# Patient Record
Sex: FEMALE | Race: WHITE | NOT HISPANIC OR LATINO | Employment: PART TIME | URBAN - METROPOLITAN AREA
[De-identification: names, ages, dates, MRNs, and addresses within clinical notes are randomized per-mention and may not be internally consistent; named-entity substitution may affect disease eponyms.]

---
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
## 2022-03-06 ENCOUNTER — OFFICE VISIT (OUTPATIENT)
Dept: URGENT CARE | Facility: CLINIC | Age: 22
End: 2022-03-06
Payer: COMMERCIAL

## 2022-03-06 VITALS
OXYGEN SATURATION: 97 % | HEIGHT: 61 IN | WEIGHT: 116.8 LBS | DIASTOLIC BLOOD PRESSURE: 70 MMHG | SYSTOLIC BLOOD PRESSURE: 102 MMHG | TEMPERATURE: 97.8 F | BODY MASS INDEX: 22.05 KG/M2 | RESPIRATION RATE: 14 BRPM | HEART RATE: 88 BPM

## 2022-03-06 DIAGNOSIS — H10.31 ACUTE BACTERIAL CONJUNCTIVITIS OF RIGHT EYE: ICD-10-CM

## 2022-03-06 PROCEDURE — 99213 OFFICE O/P EST LOW 20 MIN: CPT | Performed by: NURSE PRACTITIONER

## 2022-03-06 RX ORDER — ESCITALOPRAM OXALATE 10 MG/1
10 TABLET ORAL DAILY
COMMUNITY

## 2022-03-06 RX ORDER — GENTAMICIN SULFATE 3 MG/ML
1 SOLUTION/ DROPS OPHTHALMIC EVERY 4 HOURS
Qty: 5 ML | Refills: 0 | Status: SHIPPED | OUTPATIENT
Start: 2022-03-06 | End: 2022-03-11

## 2022-03-06 ASSESSMENT — ENCOUNTER SYMPTOMS
EYE REDNESS: 1
EYE PAIN: 1
SHORTNESS OF BREATH: 0
EYE ITCHING: 1
VOMITING: 0
EYE DISCHARGE: 1
DOUBLE VISION: 0
PHOTOPHOBIA: 0
FEVER: 0
SORE THROAT: 0
MYALGIAS: 0
NAUSEA: 0
CHILLS: 0
DIZZINESS: 0

## 2022-03-06 NOTE — PROGRESS NOTES
"Subjective:   Dian Taylor is a 22 y.o. female who presents for Eye Problem ((R) eye, red/sore x 4 days )      Eye Problem   The right eye is affected. This is a new problem. Episode onset: 4 days;  The problem occurs constantly. The problem has been unchanged. The injury mechanism was contact lenses. The pain is at a severity of 2/10. The pain is mild. There is no known exposure to pink eye. She wears contacts. Associated symptoms include an eye discharge, eye redness and itching. Pertinent negatives include no double vision, fever, nausea, photophobia, recent URI or vomiting. She has tried nothing for the symptoms. The treatment provided no relief.       Review of Systems   Constitutional: Negative for chills and fever.   HENT: Negative for sore throat.    Eyes: Positive for pain, discharge, redness and itching. Negative for double vision and photophobia.   Respiratory: Negative for shortness of breath.    Cardiovascular: Negative for chest pain.   Gastrointestinal: Negative for nausea and vomiting.   Genitourinary: Negative for hematuria.   Musculoskeletal: Negative for myalgias.   Skin: Negative for rash.   Neurological: Negative for dizziness.       Medications:    • escitalopram Tabs  • gentamicin Soln    Allergies: Patient has no known allergies.    Problem List: Dian Taylor does not have a problem list on file.    Surgical History:  No past surgical history on file.    Past Social Hx: Dian Taylor  reports that she has never smoked. She has never used smokeless tobacco. She reports previous alcohol use. She reports that she does not use drugs.     Past Family Hx:  Dian Taylor family history is not on file.     Problem list, medications, and allergies reviewed by myself today in Epic.     Objective:     /70   Pulse 88   Temp 36.6 °C (97.8 °F)   Resp 14   Ht 1.549 m (5' 1\")   Wt 53 kg (116 lb 12.8 oz)   SpO2 97%   BMI 22.07 kg/m²     Physical " Exam  Constitutional:       Appearance: Normal appearance. She is not ill-appearing or toxic-appearing.   HENT:      Head: Normocephalic. No right periorbital erythema or left periorbital erythema.      Right Ear: External ear normal.      Left Ear: External ear normal.      Nose: Nose normal.      Mouth/Throat:      Lips: Pink.      Mouth: Mucous membranes are moist.   Eyes:      General: Lids are normal.         Right eye: Discharge present.         Left eye: No discharge.      Conjunctiva/sclera:      Right eye: Right conjunctiva is injected.   Pulmonary:      Effort: Pulmonary effort is normal. No accessory muscle usage or respiratory distress.   Musculoskeletal:         General: Normal range of motion.      Cervical back: Full passive range of motion without pain.   Skin:     Coloration: Skin is not pale.   Neurological:      Mental Status: She is alert and oriented to person, place, and time.   Psychiatric:         Mood and Affect: Mood normal.         Thought Content: Thought content normal.        Visual Acuity Screening    Right eye Left eye Both eyes   Without correction: 20/50 20/30 20/25   With correction:          Assessment/Plan:     Diagnosis and associated orders:     1. Acute bacterial conjunctivitis of right eye  gentamicin (GARAMYCIN) 0.3 % Solution      Comments/MDM:   Recommended not wearing contacts until infection has resolved  Warm compresses to affected eye(s) 3 times daily  Hand hygiene discussed  Wash all recently used linens and towels in hot water  • Do not touch dropper to eye (s)    Differential diagnosis, natural history, supportive care, and indications for immediate follow-up discussed.            Please note that this dictation was created using voice recognition software. I have made a reasonable attempt to correct obvious errors, but I expect that there are errors of grammar and possibly content that I did not discover before finalizing the note.    This note was electronically  signed by iLor OTERO.

## 2023-12-03 ENCOUNTER — OFFICE VISIT (OUTPATIENT)
Dept: URGENT CARE | Facility: CLINIC | Age: 23
End: 2023-12-03
Payer: COMMERCIAL

## 2023-12-03 VITALS
TEMPERATURE: 97.3 F | OXYGEN SATURATION: 99 % | SYSTOLIC BLOOD PRESSURE: 106 MMHG | RESPIRATION RATE: 17 BRPM | HEART RATE: 96 BPM | HEIGHT: 61 IN | DIASTOLIC BLOOD PRESSURE: 62 MMHG | BODY MASS INDEX: 23.22 KG/M2 | WEIGHT: 123 LBS

## 2023-12-03 DIAGNOSIS — B96.89 ACUTE BACTERIAL SINUSITIS: ICD-10-CM

## 2023-12-03 DIAGNOSIS — J01.90 ACUTE BACTERIAL SINUSITIS: ICD-10-CM

## 2023-12-03 PROCEDURE — 3074F SYST BP LT 130 MM HG: CPT

## 2023-12-03 PROCEDURE — 3078F DIAST BP <80 MM HG: CPT

## 2023-12-03 PROCEDURE — 99213 OFFICE O/P EST LOW 20 MIN: CPT

## 2023-12-03 RX ORDER — FLUTICASONE PROPIONATE 50 MCG
1 SPRAY, SUSPENSION (ML) NASAL DAILY
Qty: 16 G | Refills: 0 | Status: SHIPPED | OUTPATIENT
Start: 2023-12-03

## 2023-12-03 RX ORDER — AMOXICILLIN AND CLAVULANATE POTASSIUM 875; 125 MG/1; MG/1
1 TABLET, FILM COATED ORAL 2 TIMES DAILY
Qty: 14 TABLET | Refills: 0 | Status: SHIPPED | OUTPATIENT
Start: 2023-12-03 | End: 2023-12-10

## 2023-12-03 ASSESSMENT — ENCOUNTER SYMPTOMS
SINUS PAIN: 1
COUGH: 1
FEVER: 1

## 2023-12-03 NOTE — PROGRESS NOTES
"Subjective:   Dian Taylor is a 23 y.o. female who presents for Fever (Patient states that she has had symptoms for a week and a half. ), Body Aches, and Nasal Congestion      HPI: This is a 23-year-old female who presents today for sinus problem.  Patient reports developing nasal congestion, cough,, fevers, body aches, discolored nasal drainage 1.5 weeks ago.  She reports taking Mucinex and NyQuil without any improvement in her symptoms.  She reports associated mild wheezing and shortness of breath.  She denies underlying history of asthma.    Review of Systems   Constitutional:  Positive for fever.   HENT:  Positive for congestion and sinus pain.    Respiratory:  Positive for cough.        Medications:    Current Outpatient Medications on File Prior to Visit   Medication Sig Dispense Refill    escitalopram (LEXAPRO) 10 MG Tab Take 10 mg by mouth every day.       No current facility-administered medications on file prior to visit.        Allergies:   Patient has no known allergies.    Problem List:   There is no problem list on file for this patient.       Surgical History:  No past surgical history on file.    Past Social Hx:   Social History     Tobacco Use    Smoking status: Never    Smokeless tobacco: Never   Vaping Use    Vaping Use: Never used   Substance Use Topics    Alcohol use: Not Currently    Drug use: Never          Problem list, medications, and allergies reviewed by myself today in Epic.     Objective:     /62 (BP Location: Left arm, Patient Position: Sitting, BP Cuff Size: Adult)   Pulse 96   Temp 36.3 °C (97.3 °F) (Temporal)   Resp 17   Ht 1.549 m (5' 1\")   Wt 55.8 kg (123 lb)   SpO2 99%   BMI 23.24 kg/m²     Physical Exam  Vitals and nursing note reviewed.   Constitutional:       General: She is not in acute distress.     Appearance: Normal appearance. She is normal weight. She is not ill-appearing, toxic-appearing or diaphoretic.   HENT:      Head: Normocephalic and " atraumatic.      Right Ear: Tympanic membrane, ear canal and external ear normal. There is no impacted cerumen.      Left Ear: Tympanic membrane, ear canal and external ear normal. There is no impacted cerumen.      Nose: Congestion and rhinorrhea present.      Right Turbinates: Swollen.      Left Turbinates: Swollen.      Mouth/Throat:      Mouth: Mucous membranes are moist.      Pharynx: Oropharynx is clear. No oropharyngeal exudate or posterior oropharyngeal erythema.   Cardiovascular:      Rate and Rhythm: Normal rate and regular rhythm.      Pulses: Normal pulses.      Heart sounds: Normal heart sounds. No murmur heard.     No friction rub. No gallop.   Pulmonary:      Effort: Pulmonary effort is normal. No respiratory distress.      Breath sounds: Normal breath sounds. No stridor. No wheezing, rhonchi or rales.   Chest:      Chest wall: No tenderness.   Musculoskeletal:      Cervical back: Neck supple. No tenderness.   Lymphadenopathy:      Cervical: No cervical adenopathy.   Skin:     General: Skin is warm and dry.      Capillary Refill: Capillary refill takes less than 2 seconds.   Neurological:      General: No focal deficit present.      Mental Status: She is alert and oriented to person, place, and time. Mental status is at baseline.      Cranial Nerves: No cranial nerve deficit.      Motor: No weakness.      Gait: Gait normal.   Psychiatric:         Mood and Affect: Mood normal.         Behavior: Behavior normal.         Thought Content: Thought content normal.         Judgment: Judgment normal.         Assessment/Plan:     Diagnosis and associated orders:   1. Acute bacterial sinusitis  amoxicillin-clavulanate (AUGMENTIN) 875-125 MG Tab    fluticasone (FLONASE) 50 MCG/ACT nasal spray             Comments/MDM:   Pt is clinically stable at today's acute urgent care visit.  No acute distress noted. Appropriate for outpatient management at this time.     Acute problem  Augmentin as prescribed  Fluticasone  nasal spray as prescribed  Intranasal saline irrigations  Alternate Tylenol and ibuprofen as needed for pain  Using [place] fire  Return for any new or worsening signs or symptoms or symptoms that fail to improve.           Discussed DDx, management options (risks,benefits, and alternatives to planned treatment), natural progression and supportive care.  Expressed understanding and the treatment plan was agreed upon. Questions were encouraged and answered   Return to urgent care prn if new or worsening sx or if there is no improvement in condition prn.    Educated in Red flags and indications to immediately call 911 or present to the Emergency Department.   Advised the patient to follow-up with the primary care physician for recheck, reevaluation, and consideration of further management.    I personally reviewed prior external notes and test results pertinent to today's visit.  I have independently reviewed and interpreted all diagnostics ordered during this urgent care acute visit.       Please note that this dictation was created using voice recognition software. I have made a reasonable attempt to correct obvious errors, but I expect that there are errors of grammar and possibly content that I did not discover before finalizing the note.    This note was electronically signed by BRANDEN Sigala

## 2024-02-04 DIAGNOSIS — J01.90 ACUTE BACTERIAL SINUSITIS: ICD-10-CM

## 2024-02-04 DIAGNOSIS — B96.89 ACUTE BACTERIAL SINUSITIS: ICD-10-CM

## 2024-02-20 ENCOUNTER — OFFICE VISIT (OUTPATIENT)
Dept: URGENT CARE | Facility: CLINIC | Age: 24
End: 2024-02-20
Payer: COMMERCIAL

## 2024-02-20 ENCOUNTER — HOSPITAL ENCOUNTER (OUTPATIENT)
Facility: MEDICAL CENTER | Age: 24
End: 2024-02-20
Attending: PHYSICIAN ASSISTANT
Payer: COMMERCIAL

## 2024-02-20 VITALS
WEIGHT: 125.3 LBS | HEART RATE: 82 BPM | TEMPERATURE: 98.4 F | SYSTOLIC BLOOD PRESSURE: 102 MMHG | RESPIRATION RATE: 16 BRPM | BODY MASS INDEX: 23.66 KG/M2 | OXYGEN SATURATION: 98 % | DIASTOLIC BLOOD PRESSURE: 70 MMHG | HEIGHT: 61 IN

## 2024-02-20 DIAGNOSIS — N89.8 VAGINAL IRRITATION: ICD-10-CM

## 2024-02-20 DIAGNOSIS — R30.9 URINATION PAIN: ICD-10-CM

## 2024-02-20 LAB
APPEARANCE UR: CLEAR
BILIRUB UR STRIP-MCNC: NEGATIVE MG/DL
COLOR UR AUTO: YELLOW
GLUCOSE UR STRIP.AUTO-MCNC: NEGATIVE MG/DL
KETONES UR STRIP.AUTO-MCNC: NEGATIVE MG/DL
LEUKOCYTE ESTERASE UR QL STRIP.AUTO: NORMAL
NITRITE UR QL STRIP.AUTO: NEGATIVE
PH UR STRIP.AUTO: 8 [PH] (ref 5–8)
POCT INT CON NEG: NEGATIVE
POCT INT CON POS: POSITIVE
POCT URINE PREGNANCY TEST: NEGATIVE
PROT UR QL STRIP: NORMAL MG/DL
RBC UR QL AUTO: NEGATIVE
SP GR UR STRIP.AUTO: 1.01
UROBILINOGEN UR STRIP-MCNC: NORMAL MG/DL

## 2024-02-20 PROCEDURE — 99214 OFFICE O/P EST MOD 30 MIN: CPT | Performed by: PHYSICIAN ASSISTANT

## 2024-02-20 PROCEDURE — 87480 CANDIDA DNA DIR PROBE: CPT

## 2024-02-20 PROCEDURE — 81002 URINALYSIS NONAUTO W/O SCOPE: CPT | Performed by: PHYSICIAN ASSISTANT

## 2024-02-20 PROCEDURE — 87660 TRICHOMONAS VAGIN DIR PROBE: CPT

## 2024-02-20 PROCEDURE — 3074F SYST BP LT 130 MM HG: CPT | Performed by: PHYSICIAN ASSISTANT

## 2024-02-20 PROCEDURE — 87491 CHLMYD TRACH DNA AMP PROBE: CPT

## 2024-02-20 PROCEDURE — 87591 N.GONORRHOEAE DNA AMP PROB: CPT

## 2024-02-20 PROCEDURE — 87510 GARDNER VAG DNA DIR PROBE: CPT

## 2024-02-20 PROCEDURE — 3078F DIAST BP <80 MM HG: CPT | Performed by: PHYSICIAN ASSISTANT

## 2024-02-20 PROCEDURE — 87086 URINE CULTURE/COLONY COUNT: CPT

## 2024-02-20 PROCEDURE — 81025 URINE PREGNANCY TEST: CPT | Performed by: PHYSICIAN ASSISTANT

## 2024-02-20 ASSESSMENT — ENCOUNTER SYMPTOMS
SORE THROAT: 0
ABDOMINAL PAIN: 0
MYALGIAS: 0
FEVER: 0
CHILLS: 0
VOMITING: 0
SHORTNESS OF BREATH: 0
COUGH: 0
EYE PAIN: 0
DIARRHEA: 0
NAUSEA: 0
CONSTIPATION: 0
HEADACHES: 0

## 2024-02-20 NOTE — PROGRESS NOTES
"Subjective:   Dian Taylor is a 24 y.o. female who presents for UTI (UTI started two weeks ago and she thought it was gone until this morning. Really bad burning when urinating.)      24 years old female presents for.  Vaginal irritation, started around 2 weeks ago but since this morning it has been an ongoing burning.  She has noted some urinary frequency but notes vaginal burning even when not urinating.  She has not noted any external rash or lesions.  She has history of a previous yeast infection as well as UTI and notes the symptoms feel different.  She is sexually active    Review of Systems   Constitutional:  Negative for chills and fever.   HENT:  Negative for congestion, ear pain and sore throat.    Eyes:  Negative for pain.   Respiratory:  Negative for cough and shortness of breath.    Cardiovascular:  Negative for chest pain.   Gastrointestinal:  Negative for abdominal pain, constipation, diarrhea, nausea and vomiting.   Genitourinary:  Positive for dysuria.   Musculoskeletal:  Negative for myalgias.   Skin:  Negative for rash.   Neurological:  Negative for headaches.       Medications, Allergies, and current problem list reviewed today in Epic.     Objective:     /70 (BP Location: Left arm, Patient Position: Sitting, BP Cuff Size: Adult)   Pulse 82   Temp 36.9 °C (98.4 °F) (Temporal)   Resp 16   Ht 1.549 m (5' 1\")   Wt 56.8 kg (125 lb 4.8 oz)   SpO2 98%     Physical Exam  Exam conducted with a chaperone present.   Abdominal:      Hernia: There is no hernia in the left inguinal area.   Genitourinary:     General: Normal vulva.      Labia:         Right: No rash, tenderness, lesion or injury.         Left: No rash, tenderness, lesion or injury.       Vagina: Normal. No signs of injury. No vaginal discharge, erythema, tenderness or lesions.      Comments: ARIEL ARMENDARIZ as chaperone. Limited external exam. No abnormalities.   Lymphadenopathy:      Lower Body: No left inguinal adenopathy. "         Assessment/Plan:     Diagnosis and associated orders:     1. Urination pain  POCT Pregnancy    POCT Urinalysis    URINE CULTURE(NEW)      2. Vaginal irritation  VAGINAL PATHOGENS DNA PANEL    Chlamydia/GC, PCR (Genital/Anal swab)         Comments/MDM:     Urinalysis unremarkable.  Discussed empiric treatment but it seems unclear the etiology of her symptoms.  Parents did test was negative.  Will send out urine culture as well as vaginal pathogens and gonorrhea and chlamydia swab.  On exam no abnormalities..         Differential diagnosis, natural history, supportive care, and indications for immediate follow-up discussed.    Advised the patient to follow-up with the primary care physician for recheck, reevaluation, and consideration of further management.    Please note that this dictation was created using voice recognition software. I have made a reasonable attempt to correct obvious errors, but I expect that there are errors of grammar and possibly content that I did not discover before finalizing the note.    This note was electronically signed by Marciano Mcgarry PA-C

## 2024-02-21 LAB
C TRACH DNA GENITAL QL NAA+PROBE: NEGATIVE
CANDIDA DNA VAG QL PROBE+SIG AMP: NEGATIVE
G VAGINALIS DNA VAG QL PROBE+SIG AMP: NEGATIVE
N GONORRHOEA DNA GENITAL QL NAA+PROBE: NEGATIVE
SPECIMEN SOURCE: NORMAL
T VAGINALIS DNA VAG QL PROBE+SIG AMP: NEGATIVE

## 2024-02-22 LAB
BACTERIA UR CULT: NORMAL
SIGNIFICANT IND 70042: NORMAL
SITE SITE: NORMAL
SOURCE SOURCE: NORMAL

## 2024-03-11 ENCOUNTER — APPOINTMENT (OUTPATIENT)
Dept: RADIOLOGY | Facility: MEDICAL CENTER | Age: 24
End: 2024-03-11
Attending: EMERGENCY MEDICINE
Payer: COMMERCIAL

## 2024-03-11 ENCOUNTER — HOSPITAL ENCOUNTER (EMERGENCY)
Facility: MEDICAL CENTER | Age: 24
End: 2024-03-11
Attending: EMERGENCY MEDICINE
Payer: COMMERCIAL

## 2024-03-11 VITALS
HEIGHT: 61 IN | OXYGEN SATURATION: 97 % | HEART RATE: 69 BPM | DIASTOLIC BLOOD PRESSURE: 63 MMHG | WEIGHT: 126.32 LBS | RESPIRATION RATE: 16 BRPM | BODY MASS INDEX: 23.85 KG/M2 | TEMPERATURE: 97.5 F | SYSTOLIC BLOOD PRESSURE: 101 MMHG

## 2024-03-11 DIAGNOSIS — R30.0 DYSURIA: ICD-10-CM

## 2024-03-11 LAB
ALBUMIN SERPL BCP-MCNC: 4.2 G/DL (ref 3.2–4.9)
ALBUMIN/GLOB SERPL: 1.4 G/DL
ALP SERPL-CCNC: 60 U/L (ref 30–99)
ALT SERPL-CCNC: 11 U/L (ref 2–50)
ANION GAP SERPL CALC-SCNC: 11 MMOL/L (ref 7–16)
APPEARANCE UR: CLEAR
AST SERPL-CCNC: 24 U/L (ref 12–45)
BACTERIA #/AREA URNS HPF: ABNORMAL /HPF
BASOPHILS # BLD AUTO: 0.9 % (ref 0–1.8)
BASOPHILS # BLD: 0.05 K/UL (ref 0–0.12)
BILIRUB SERPL-MCNC: 0.3 MG/DL (ref 0.1–1.5)
BILIRUB UR QL STRIP.AUTO: ABNORMAL
BUN SERPL-MCNC: 10 MG/DL (ref 8–22)
CALCIUM ALBUM COR SERPL-MCNC: 8.8 MG/DL (ref 8.5–10.5)
CALCIUM SERPL-MCNC: 9 MG/DL (ref 8.5–10.5)
CHLORIDE SERPL-SCNC: 101 MMOL/L (ref 96–112)
CO2 SERPL-SCNC: 22 MMOL/L (ref 20–33)
COLOR UR: ABNORMAL
CREAT SERPL-MCNC: 0.64 MG/DL (ref 0.5–1.4)
EOSINOPHIL # BLD AUTO: 0.04 K/UL (ref 0–0.51)
EOSINOPHIL NFR BLD: 0.7 % (ref 0–6.9)
EPI CELLS #/AREA URNS HPF: ABNORMAL /HPF
ERYTHROCYTE [DISTWIDTH] IN BLOOD BY AUTOMATED COUNT: 41.2 FL (ref 35.9–50)
GFR SERPLBLD CREATININE-BSD FMLA CKD-EPI: 128 ML/MIN/1.73 M 2
GLOBULIN SER CALC-MCNC: 3 G/DL (ref 1.9–3.5)
GLUCOSE SERPL-MCNC: 88 MG/DL (ref 65–99)
GLUCOSE UR STRIP.AUTO-MCNC: ABNORMAL MG/DL
HCG SERPL QL: NEGATIVE
HCT VFR BLD AUTO: 39.7 % (ref 37–47)
HGB BLD-MCNC: 13.9 G/DL (ref 12–16)
HYALINE CASTS #/AREA URNS LPF: ABNORMAL /LPF
IMM GRANULOCYTES # BLD AUTO: 0.02 K/UL (ref 0–0.11)
IMM GRANULOCYTES NFR BLD AUTO: 0.4 % (ref 0–0.9)
KETONES UR STRIP.AUTO-MCNC: ABNORMAL MG/DL
LEUKOCYTE ESTERASE UR QL STRIP.AUTO: ABNORMAL
LIPASE SERPL-CCNC: 32 U/L (ref 11–82)
LYMPHOCYTES # BLD AUTO: 2.33 K/UL (ref 1–4.8)
LYMPHOCYTES NFR BLD: 41.1 % (ref 22–41)
MCH RBC QN AUTO: 31.9 PG (ref 27–33)
MCHC RBC AUTO-ENTMCNC: 35 G/DL (ref 32.2–35.5)
MCV RBC AUTO: 91.1 FL (ref 81.4–97.8)
MICRO URNS: ABNORMAL
MONOCYTES # BLD AUTO: 0.39 K/UL (ref 0–0.85)
MONOCYTES NFR BLD AUTO: 6.9 % (ref 0–13.4)
NEUTROPHILS # BLD AUTO: 2.84 K/UL (ref 1.82–7.42)
NEUTROPHILS NFR BLD: 50 % (ref 44–72)
NITRITE UR QL STRIP.AUTO: ABNORMAL
NRBC # BLD AUTO: 0 K/UL
NRBC BLD-RTO: 0 /100 WBC (ref 0–0.2)
PH UR STRIP.AUTO: ABNORMAL [PH] (ref 5–8)
PLATELET # BLD AUTO: 326 K/UL (ref 164–446)
PMV BLD AUTO: 10.9 FL (ref 9–12.9)
POTASSIUM SERPL-SCNC: 4.1 MMOL/L (ref 3.6–5.5)
PROT SERPL-MCNC: 7.2 G/DL (ref 6–8.2)
PROT UR QL STRIP: ABNORMAL MG/DL
RBC # BLD AUTO: 4.36 M/UL (ref 4.2–5.4)
RBC # URNS HPF: ABNORMAL /HPF
RBC UR QL AUTO: ABNORMAL
SODIUM SERPL-SCNC: 134 MMOL/L (ref 135–145)
SP GR UR STRIP.AUTO: ABNORMAL
UROBILINOGEN UR STRIP.AUTO-MCNC: ABNORMAL MG/DL
WBC # BLD AUTO: 5.7 K/UL (ref 4.8–10.8)
WBC #/AREA URNS HPF: ABNORMAL /HPF

## 2024-03-11 PROCEDURE — 85025 COMPLETE CBC W/AUTO DIFF WBC: CPT

## 2024-03-11 PROCEDURE — 36415 COLL VENOUS BLD VENIPUNCTURE: CPT

## 2024-03-11 PROCEDURE — 96374 THER/PROPH/DIAG INJ IV PUSH: CPT

## 2024-03-11 PROCEDURE — 84703 CHORIONIC GONADOTROPIN ASSAY: CPT

## 2024-03-11 PROCEDURE — 700111 HCHG RX REV CODE 636 W/ 250 OVERRIDE (IP): Mod: JZ | Performed by: EMERGENCY MEDICINE

## 2024-03-11 PROCEDURE — 74176 CT ABD & PELVIS W/O CONTRAST: CPT

## 2024-03-11 PROCEDURE — 83690 ASSAY OF LIPASE: CPT

## 2024-03-11 PROCEDURE — 96375 TX/PRO/DX INJ NEW DRUG ADDON: CPT

## 2024-03-11 PROCEDURE — 80053 COMPREHEN METABOLIC PANEL: CPT

## 2024-03-11 PROCEDURE — 81001 URINALYSIS AUTO W/SCOPE: CPT

## 2024-03-11 PROCEDURE — 99284 EMERGENCY DEPT VISIT MOD MDM: CPT

## 2024-03-11 RX ORDER — MORPHINE SULFATE 4 MG/ML
4 INJECTION INTRAVENOUS
Status: DISCONTINUED | OUTPATIENT
Start: 2024-03-11 | End: 2024-03-11 | Stop reason: HOSPADM

## 2024-03-11 RX ORDER — CEPHALEXIN 500 MG/1
500 CAPSULE ORAL 3 TIMES DAILY
Qty: 21 CAPSULE | Refills: 0 | Status: ACTIVE | OUTPATIENT
Start: 2024-03-11 | End: 2024-03-18

## 2024-03-11 RX ORDER — ONDANSETRON 2 MG/ML
4 INJECTION INTRAMUSCULAR; INTRAVENOUS ONCE
Status: COMPLETED | OUTPATIENT
Start: 2024-03-11 | End: 2024-03-11

## 2024-03-11 RX ADMIN — MORPHINE SULFATE 4 MG: 4 INJECTION, SOLUTION INTRAMUSCULAR; INTRAVENOUS at 15:37

## 2024-03-11 RX ADMIN — ONDANSETRON 4 MG: 2 INJECTION INTRAMUSCULAR; INTRAVENOUS at 15:22

## 2024-03-11 NOTE — ED PROVIDER NOTES
ED Provider Note    CHIEF COMPLAINT  Chief Complaint   Patient presents with    Flank Pain     L flank pain since this morning.     Urinary Pain     X a few weeks. Was seen at  and didn't have one but continues to have the burning. Has been taking azo.     Nausea     Today and a few times this week         HPI  Dian Taylor is a 24 y.o. female who presents for evaluation of what appears to be subacute dysuria with a burning sensation and frequency of urination, as well as new symptoms today of severe left flank pain which was sudden onset and unlike any pain she has felt before.  Patient notes no fevers or chills and was seen at urgent care on 20 February for similar symptoms.  She was tested for STDs which were negative as was her urinalysis.  She has been taking Azo for the discomfort but the pain today was highly concerning to her as it was entirely new and associated with nausea.  She notes no history of kidney stones or pyelonephritis and has had no medical issues or chronic medications otherwise.  Currently the pain in her flank has subsided but she can still feel discomfort.  EXTERNAL RECORDS REVIEWED  Reviewed urgent care visit on 20 February of this year.  ROS  Constitutional: No fevers or chills  Skin: No rashes  HEENT: No sore throat, runny nose  Neck: No neck pain  Chest: No pain or rashes  Pulm: No shortness of breath, cough, wheezing, stridor, or pain with inspiration/expiration  Gastrointestinal: No nausea, vomiting, diarrhea, constipation, bloating, melena, hematochezia or anterior abdominal pain.  Left flank pain  Genitourinary: Constant dysuria and frequency  Musculoskeletal: No pain, swelling, or weakness  Heme: No bleeding or bruising problems.   Immuno: No hx of recurrent infections        LIMITATION TO HISTORY   None  OUTSIDE HISTORIAN(S):  none        PAST FAM HISTORY  No family history on file.    PAST MEDICAL HISTORY   No significant past medical history    SOCIAL HISTORY  Social  Unclear etiology, however, given sudden onset of episodes c/b trauma c/f cardiac arrhythmia vs CA stenosis;   Unclear if triggered by a medication;   - EP c/s for ILR given 3 episodes of LOC c/b facial trauma (poor candidate for Holter monitor given uncontrolled psych d/o)  - EKG: QTc wnl  - Cardiac monitoring   - CE negative, TSH normal  - Transthoracic Echocardiogram (08.14.22 @ 10:50) Normal left ventricular internal dimensions and wall thicknesses. Normal left ventricular systolic function. No segmental wall motion abnormalities. Normal left ventricular diastolic function. Normal right ventricular size and function.  - VA Duplex Carotid Arteries, Bilateral. (08.14.22 @ 11:02): Minimal heterogenous plaque noted within the proximal right and left internal carotid arteries, consistent with 1-15% stenoses.  No hemodynamically significant stenoses noted  - Orthostatics negative - 08-13-22 @ 02:36: Lying BP: 126/74 HR: 54, Sitting BP: 139/72 HR: 56, Standing BP: 145/90 HR: 64  - Fall, seizure precautions  - Started on midodrine 5mg TID Unclear etiology, however, given sudden onset of episodes c/b trauma c/f cardiac arrhythmia vs CA stenosis;   Unclear if triggered by a medication; Resolved.  - EP c/s for ILR given 3 episodes of LOC c/b facial trauma (poor candidate for Holter monitor given uncontrolled psych d/o)  - EKG: QTc wnl  - Cardiac monitoring   - CE negative, TSH normal  - Transthoracic Echocardiogram (08.14.22 @ 10:50) Normal left ventricular internal dimensions and wall thicknesses. Normal left ventricular systolic function. No segmental wall motion abnormalities. Normal left ventricular diastolic function. Normal right ventricular size and function.  - VA Duplex Carotid Arteries, Bilateral. (08.14.22 @ 11:02): Minimal heterogenous plaque noted within the proximal right and left internal carotid arteries, consistent with 1-15% stenoses.  No hemodynamically significant stenoses noted  - Orthostatics negative - 08-13-22 @ 02:36: Lying BP: 126/74 HR: 54, Sitting BP: 139/72 HR: 56, Standing BP: 145/90 HR: 64  - Fall, seizure precautions "History     Tobacco Use    Smoking status: Never    Smokeless tobacco: Never   Vaping Use    Vaping Use: Every day    Substances: Nicotine, THC    Devices: Disposable   Substance and Sexual Activity    Alcohol use: Yes    Drug use: Yes     Types: Inhaled, Marijuana    Sexual activity: Yes     Partners: Male     Birth control/protection: Pill       SURGICAL HISTORY  patient denies any surgical history    CURRENT MEDICATIONS  Home Medications       Reviewed by Anam Fung R.N. (Registered Nurse) on 03/11/24 at 1410  Med List Status: Partial     Medication Last Dose Status   escitalopram (LEXAPRO) 10 MG Tab  Active   fluticasone (FLONASE) 50 MCG/ACT nasal spray  Active                     ALLERGIES  No Known Allergies    PHYSICAL EXAM  VITAL SIGNS: /63   Pulse 69   Temp 36.4 °C (97.5 °F) (Temporal)   Resp 16   Ht 1.549 m (5' 1\")   Wt 57.3 kg (126 lb 5.2 oz)   SpO2 97%   BMI 23.87 kg/m²    Gen: Alert in no apparent distress.  Smiling, conversant  HEENT: No signs of trauma, Bilateral external ears normal, Nose normal. Conjunctiva normal, Non-icteric.   Cardiovascular: Regular rate and rhythm, no murmurs.  Capillary refill less than 3 seconds to all extremities, 2+ distal pulses.  Thorax & Lungs: Normal breath sounds, No respiratory distress, No wheezing bilateral chest rise  Abdomen: Bowel sounds normal, Soft, No tenderness, No masses, No pulsatile masses. No Guarding or rebound  Skin: Warm, Dry  Back: No bony tenderness, No CVA tenderness.   Extremities: Intact distal pulses, No edema  Neurologic: Alert , no facial droop, grossly normal coordination and strength  Psychiatric: Affect pleasant    INITIAL IMPRESSION  Patient arrives for evaluation of symptoms that are highly suggestive of ureteral colic, possibly related to a ureteral stone.  Patient notes no history of this and notes no recent blood in the urine.  She does note that her urine is markedly orange due to the Azo she has been taking.  She has " Unclear etiology, however, given sudden onset of episodes c/b trauma c/f cardiac arrhythmia vs CA stenosis;   Unclear if triggered by a medication;   - EP c/s for ILR given 3 episodes of LOC c/b facial trauma (poor candidate for Holter monitor given uncontrolled psych d/o)  - EKG: QTc wnl  - Cardiac monitoring   - CE negative, TSH normal  - Transthoracic Echocardiogram (08.14.22 @ 10:50) Normal left ventricular internal dimensions and wall thicknesses. Normal left ventricular systolic function. No segmental wall motion abnormalities. Normal left ventricular diastolic function. Normal right ventricular size and function.  - VA Duplex Carotid Arteries, Bilateral. (08.14.22 @ 11:02): Minimal heterogenous plaque noted within the proximal right and left internal carotid arteries, consistent with 1-15% stenoses.  No hemodynamically significant stenoses noted  - Orthostatics negative - 08-13-22 @ 02:36: Lying BP: 126/74 HR: 54, Sitting BP: 139/72 HR: 56, Standing BP: 145/90 HR: 64  - Fall, seizure precautions had no fevers or chills and does not appear septic or toxic.  Will treat empirically with morphine as she feels the pain is returning and we will also give her fluid bolus as well as nausea medication empirically.  She will be taken to CT for noncontrast CT to evaluate for ureteral colic/stones.  I do not feel repeating her STD workup is necessary but we will repeat the urinalysis.    ED observation? No  LABS  Results for orders placed or performed during the hospital encounter of 03/11/24   CBC WITH DIFFERENTIAL   Result Value Ref Range    WBC 5.7 4.8 - 10.8 K/uL    RBC 4.36 4.20 - 5.40 M/uL    Hemoglobin 13.9 12.0 - 16.0 g/dL    Hematocrit 39.7 37.0 - 47.0 %    MCV 91.1 81.4 - 97.8 fL    MCH 31.9 27.0 - 33.0 pg    MCHC 35.0 32.2 - 35.5 g/dL    RDW 41.2 35.9 - 50.0 fL    Platelet Count 326 164 - 446 K/uL    MPV 10.9 9.0 - 12.9 fL    Neutrophils-Polys 50.00 44.00 - 72.00 %    Lymphocytes 41.10 (H) 22.00 - 41.00 %    Monocytes 6.90 0.00 - 13.40 %    Eosinophils 0.70 0.00 - 6.90 %    Basophils 0.90 0.00 - 1.80 %    Immature Granulocytes 0.40 0.00 - 0.90 %    Nucleated RBC 0.00 0.00 - 0.20 /100 WBC    Neutrophils (Absolute) 2.84 1.82 - 7.42 K/uL    Lymphs (Absolute) 2.33 1.00 - 4.80 K/uL    Monos (Absolute) 0.39 0.00 - 0.85 K/uL    Eos (Absolute) 0.04 0.00 - 0.51 K/uL    Baso (Absolute) 0.05 0.00 - 0.12 K/uL    Immature Granulocytes (abs) 0.02 0.00 - 0.11 K/uL    NRBC (Absolute) 0.00 K/uL   COMP METABOLIC PANEL   Result Value Ref Range    Sodium 134 (L) 135 - 145 mmol/L    Potassium 4.1 3.6 - 5.5 mmol/L    Chloride 101 96 - 112 mmol/L    Co2 22 20 - 33 mmol/L    Anion Gap 11.0 7.0 - 16.0    Glucose 88 65 - 99 mg/dL    Bun 10 8 - 22 mg/dL    Creatinine 0.64 0.50 - 1.40 mg/dL    Calcium 9.0 8.5 - 10.5 mg/dL    Correct Calcium 8.8 8.5 - 10.5 mg/dL    AST(SGOT) 24 12 - 45 U/L    ALT(SGPT) 11 2 - 50 U/L    Alkaline Phosphatase 60 30 - 99 U/L    Total Bilirubin 0.3 0.1 - 1.5 mg/dL    Albumin 4.2 3.2 - 4.9 g/dL    Total  Protein 7.2 6.0 - 8.2 g/dL    Globulin 3.0 1.9 - 3.5 g/dL    A-G Ratio 1.4 g/dL   LIPASE   Result Value Ref Range    Lipase 32 11 - 82 U/L   HCG QUAL SERUM   Result Value Ref Range    Beta-Hcg Qualitative Serum Negative Negative   URINALYSIS,CULTURE IF INDICATED    Specimen: Urine   Result Value Ref Range    Color Orange (A)     Character Clear     Specific Gravity see below <1.035    Ph see below 5.0 - 8.0    Glucose see below Negative mg/dL    Ketones see below Negative mg/dL    Protein see below Negative mg/dL    Bilirubin see below Negative    Urobilinogen, Urine see below Negative    Nitrite see below Negative    Leukocyte Esterase see below Negative    Occult Blood see below Negative    Micro Urine Req Microscopic    ESTIMATED GFR   Result Value Ref Range    GFR (CKD-EPI) 128 >60 mL/min/1.73 m 2   URINE MICROSCOPIC (W/UA)   Result Value Ref Range    WBC 0-2 /hpf    RBC 0-2 /hpf    Bacteria Rare (A) None /hpf    Epithelial Cells Few /hpf    Hyaline Cast 0-2 /lpf     RADIOLOGY  CT-RENAL COLIC EVALUATION(A/P W/O)   Final Result      1.  No renal stone or hydronephrosis.   2.  No focal mesenteric inflammatory process.   3.  No secondary signs of acute appendicitis, however the appendix is not well visualized.   4.  Trace pelvic fluid, likely physiologic.               HYDRATION: Based on the patient's presentation of Inability to take oral fluids the patient was given IV fluids. IV Hydration was used because oral hydration was not adequate alone. Upon recheck following hydration, the patient was improving.        COURSE & MEDICAL DECISION MAKING  Pertinent Labs & Imaging studies reviewed. (See chart for details)  Patient's labs and imaging were curiously reassuring.  There was no evidence for even a recently passed ureteral stone on the left.  There are no areas of inflammation or phlegmon and, although her appendix was poorly visualized, it seemed to be in the appropriate position and she did not have pain in her  right lower quadrant to suggest any form of inflammation in that area.  Patient appeared nontoxic upon reevaluation and states understanding of empiric treatment.  Given that the chronicity of the symptoms and the possibility of an unmeasured bacteria in her urine, I feel empiric treatment with Keflex is reasonable.   She does not have any vaginal discharge or bleeding and I do not feel pelvic exam will benefit the patient or .  I did consider further imaging with a pelvic ultrasound, but again, with no pelvic symptoms it seems unlikely to benefit the patient.  No areas of inflammation or abnormalities noted within the adnexa on CT regularly gets significant pathology in that area as the cause of her symptoms.  She will need to follow-up with her primary care physician if symptoms persist as she will likely need referral to a specialist to help figure out the problem. She is comfortable with the plan for discharge and will return if her symptoms worsen or change in any way.        I have discussed management of the patient with the following physicians and JOSEMANUEL's: None    Escalation of care considered, and ultimately not performed: Ultrasound, pelvic exam    Barriers to care at this time, including but not limited to: None.     Decision tools and Rx drugs considered including, but not limited to : Antibiotics    Discussion of management with other [facility] or appropriate source(s): None    The patient will not drink alcohol nor drive with prescribed medications. The patient will return for worsening symptoms and is stable at the time of discharge. The patient verbalizes understanding and will comply.    FINAL IMPRESSION  1. Dysuria        Electronically signed by: Steve Guerrero M.D., 3/11/2024 2:58 PM    Unclear etiology, however, given sudden onset of episodes c/b trauma c/f cardiac arrhythmia vs CA stenosis;   Unclear if triggered by a medication;   - EP c/s for ILR given 3 episodes of LOC c/b facial trauma (poor candidate for Holter monitor given uncontrolled psych d/o)  - EKG: QTc wnl  - Cardiac monitoring   - CE negative, TSH normal  - Transthoracic Echocardiogram (08.14.22 @ 10:50) Normal left ventricular internal dimensions and wall thicknesses. Normal left ventricular systolic function. No segmental wall motion abnormalities. Normal left ventricular diastolic function. Normal right ventricular size and function.  - VA Duplex Carotid Arteries, Bilateral. (08.14.22 @ 11:02): Minimal heterogenous plaque noted within the proximal right and left internal carotid arteries, consistent with 1-15% stenoses.  No hemodynamically significant stenoses noted  - Orthostatics negative - 08-13-22 @ 02:36: Lying BP: 126/74 HR: 54, Sitting BP: 139/72 HR: 56, Standing BP: 145/90 HR: 64  - Fall, seizure precautions Unclear etiology, however, given sudden onset of episodes c/b trauma c/f cardiac arrhythmia vs CA stenosis;   Unclear if triggered by a medication;   - EP c/s for ILR given 3 episodes of LOC c/b facial trauma (poor candidate for Holter monitor given uncontrolled psych d/o)  - EKG: QTc wnl  - Cardiac monitoring   - CE negative, TSH normal  - Transthoracic Echocardiogram (08.14.22 @ 10:50) Normal left ventricular internal dimensions and wall thicknesses. Normal left ventricular systolic function. No segmental wall motion abnormalities. Normal left ventricular diastolic function. Normal right ventricular size and function.  - VA Duplex Carotid Arteries, Bilateral. (08.14.22 @ 11:02): Minimal heterogenous plaque noted within the proximal right and left internal carotid arteries, consistent with 1-15% stenoses.  No hemodynamically significant stenoses noted  - Orthostatics negative - 08-13-22 @ 02:36: Lying BP: 126/74 HR: 54, Sitting BP: 139/72 HR: 56, Standing BP: 145/90 HR: 64  - Fall, seizure precautions  - Started on midodrine 5mg TID Unclear etiology, however, given sudden onset of episodes c/b trauma c/f cardiac arrhythmia vs CA stenosis;   Unclear if triggered by a medication;   - EP c/s for ILR given 3 episodes of LOC c/b facial trauma (poor candidate for Holter monitor given uncontrolled psych d/o)  - EKG: QTc wnl  - Cardiac monitoring   - CE negative, TSH normal  - Transthoracic Echocardiogram (08.14.22 @ 10:50) Normal left ventricular internal dimensions and wall thicknesses. Normal left ventricular systolic function. No segmental wall motion abnormalities. Normal left ventricular diastolic function. Normal right ventricular size and function.  - VA Duplex Carotid Arteries, Bilateral. (08.14.22 @ 11:02): Minimal heterogenous plaque noted within the proximal right and left internal carotid arteries, consistent with 1-15% stenoses.  No hemodynamically significant stenoses noted  - Orthostatics negative - 08-13-22 @ 02:36: Lying BP: 126/74 HR: 54, Sitting BP: 139/72 HR: 56, Standing BP: 145/90 HR: 64  - Fall, seizure precautions Unclear etiology, however, given sudden onset of episodes c/b trauma c/f cardiac arrhythmia vs CA stenosis;   Unclear if triggered by a medication;   - EP c/s for ILR given 3 episodes of LOC c/b facial trauma (poor candidate for Holter monitor given uncontrolled psych d/o)  - EKG: QTc wnl  - Cardiac monitoring   - CE negative, TSH normal  - Transthoracic Echocardiogram (08.14.22 @ 10:50) Normal left ventricular internal dimensions and wall thicknesses. Normal left ventricular systolic function. No segmental wall motion abnormalities. Normal left ventricular diastolic function. Normal right ventricular size and function.  - VA Duplex Carotid Arteries, Bilateral. (08.14.22 @ 11:02): Minimal heterogenous plaque noted within the proximal right and left internal carotid arteries, consistent with 1-15% stenoses.  No hemodynamically significant stenoses noted  - Orthostatics negative - 08-13-22 @ 02:36: Lying BP: 126/74 HR: 54, Sitting BP: 139/72 HR: 56, Standing BP: 145/90 HR: 64  - Fall, seizure precautions Unclear etiology, however, given sudden onset of episodes c/b trauma c/f cardiac arrhythmia vs CA stenosis;   Unclear if triggered by a medication;   - EP c/s for ILR given 3 episodes of LOC c/b facial trauma (poor candidate for Holter monitor given uncontrolled psych d/o)  - EKG: QTc wnl  - Cardiac monitoring   - CE negative, TSH normal  - Transthoracic Echocardiogram (08.14.22 @ 10:50) Normal left ventricular internal dimensions and wall thicknesses. Normal left ventricular systolic function. No segmental wall motion abnormalities. Normal left ventricular diastolic function. Normal right ventricular size and function.  - VA Duplex Carotid Arteries, Bilateral. (08.14.22 @ 11:02): Minimal heterogenous plaque noted within the proximal right and left internal carotid arteries, consistent with 1-15% stenoses.  No hemodynamically significant stenoses noted  - Orthostatics negative - 08-13-22 @ 02:36: Lying BP: 126/74 HR: 54, Sitting BP: 139/72 HR: 56, Standing BP: 145/90 HR: 64  - Fall, seizure precautions  - Started on midodrine 5mg TID Unclear etiology, however, given sudden onset of episodes c/b trauma c/f cardiac arrhythmia vs CA stenosis;   Unclear if triggered by a medication;   - EP c/s for ILR given 3 episodes of LOC c/b facial trauma (poor candidate for Holter monitor given uncontrolled psych d/o)  - EKG: QTc wnl  - Cardiac monitoring   - CE negative, TSH normal  - Transthoracic Echocardiogram (08.14.22 @ 10:50) Normal left ventricular internal dimensions and wall thicknesses. Normal left ventricular systolic function. No segmental wall motion abnormalities. Normal left ventricular diastolic function. Normal right ventricular size and function.  - VA Duplex Carotid Arteries, Bilateral. (08.14.22 @ 11:02): Minimal heterogenous plaque noted within the proximal right and left internal carotid arteries, consistent with 1-15% stenoses.  No hemodynamically significant stenoses noted  - Orthostatics negative - 08-13-22 @ 02:36: Lying BP: 126/74 HR: 54, Sitting BP: 139/72 HR: 56, Standing BP: 145/90 HR: 64  - Fall, seizure precautions Unclear etiology, however, given sudden onset of episodes c/b trauma c/f cardiac arrhythmia vs CA stenosis;   Unclear if triggered by a medication;   - EP c/s for ILR given 3 episodes of LOC c/b facial trauma (poor candidate for Holter monitor given uncontrolled psych d/o); pending echo and tele monitoring   - EKG: QTc wnl  - Cardiac monitoring   - CE negative, TSH normal  - TTE - P   - Carotid duplex - P   - Check orthostatics q6h - P   - Fall, seizure precautions Unclear etiology, however, given sudden onset of episodes c/b trauma c/f cardiac arrhythmia vs CA stenosis;   Unclear if triggered by a medication;   - EP c/s for ILR given 3 episodes of LOC c/b facial trauma (poor candidate for Holter monitor given uncontrolled psych d/o)  - EKG: QTc wnl  - Cardiac monitoring   - CE negative, TSH normal  - Transthoracic Echocardiogram (08.14.22 @ 10:50) Normal left ventricular internal dimensions and wall thicknesses. Normal left ventricular systolic function. No segmental wall motion abnormalities. Normal left ventricular diastolic function. Normal right ventricular size and function.  - VA Duplex Carotid Arteries, Bilateral. (08.14.22 @ 11:02): Minimal heterogenous plaque noted within the proximal right and left internal carotid arteries, consistent with 1-15% stenoses.  No hemodynamically significant stenoses noted  - Orthostatics negative - 08-13-22 @ 02:36: Lying BP: 126/74 HR: 54, Sitting BP: 139/72 HR: 56, Standing BP: 145/90 HR: 64  - Fall, seizure precautions Unclear etiology, however, given sudden onset of episodes c/b trauma c/f cardiac arrhythmia vs CA stenosis;   Unclear if triggered by a medication;   - EP c/s for ILR given 3 episodes of LOC c/b facial trauma (poor candidate for Holter monitor given uncontrolled psych d/o)  - EKG: QTc wnl  - Cardiac monitoring   - CE negative, TSH normal  - Transthoracic Echocardiogram (08.14.22 @ 10:50) Normal left ventricular internal dimensions and wall thicknesses. Normal left ventricular systolic function. No segmental wall motion abnormalities. Normal left ventricular diastolic function. Normal right ventricular size and function.  - VA Duplex Carotid Arteries, Bilateral. (08.14.22 @ 11:02): Minimal heterogenous plaque noted within the proximal right and left internal carotid arteries, consistent with 1-15% stenoses.  No hemodynamically significant stenoses noted  - Orthostatics negative - 08-13-22 @ 02:36: Lying BP: 126/74 HR: 54, Sitting BP: 139/72 HR: 56, Standing BP: 145/90 HR: 64  - Fall, seizure precautions Unclear etiology, however, given sudden onset of episodes c/b trauma c/f cardiac arrhythmia vs CA stenosis;   Unclear if triggered by a medication;   - EP c/s for ILR given 3 episodes of LOC c/b facial trauma (poor candidate for Holter monitor given uncontrolled psych d/o)  - EKG: QTc wnl  - Cardiac monitoring   - CE negative, TSH normal  - Transthoracic Echocardiogram (08.14.22 @ 10:50) Normal left ventricular internal dimensions and wall thicknesses. Normal left ventricular systolic function. No segmental wall motion abnormalities. Normal left ventricular diastolic function. Normal right ventricular size and function.  - VA Duplex Carotid Arteries, Bilateral. (08.14.22 @ 11:02): Minimal heterogenous plaque noted within the proximal right and left internal carotid arteries, consistent with 1-15% stenoses.  No hemodynamically significant stenoses noted  - Orthostatics negative - 08-13-22 @ 02:36: Lying BP: 126/74 HR: 54, Sitting BP: 139/72 HR: 56, Standing BP: 145/90 HR: 64  - Fall, seizure precautions Unclear etiology, however, given sudden onset of episodes c/b trauma c/f cardiac arrhythmia vs CA stenosis;   Unclear if triggered by a medication;   - EP c/s for ILR given 3 episodes of LOC c/b facial trauma (poor candidate for Holter monitor given uncontrolled psych d/o)  - EKG: QTc wnl  - Cardiac monitoring   - CE negative, TSH normal  - Transthoracic Echocardiogram (08.14.22 @ 10:50) Normal left ventricular internal dimensions and wall thicknesses. Normal left ventricular systolic function. No segmental wall motion abnormalities. Normal left ventricular diastolic function. Normal right ventricular size and function.  - VA Duplex Carotid Arteries, Bilateral. (08.14.22 @ 11:02): Minimal heterogenous plaque noted within the proximal right and left internal carotid arteries, consistent with 1-15% stenoses.  No hemodynamically significant stenoses noted  - Orthostatics negative - 08-13-22 @ 02:36: Lying BP: 126/74 HR: 54, Sitting BP: 139/72 HR: 56, Standing BP: 145/90 HR: 64  - Fall, seizure precautions

## 2024-03-11 NOTE — ED TRIAGE NOTES
"Chief Complaint   Patient presents with    Flank Pain     L flank pain since this morning.     Urinary Pain     X a few weeks. Was seen at  and didn't have one but continues to have the burning. Has been taking azo.     Nausea     Today and a few times this week       Ambulatory to triage for above. Reports mild abd pain. Protocol ordered.     /70   Pulse 71   Temp 36.4 °C (97.6 °F) (Temporal)   Resp 16   Ht 1.549 m (5' 1\")   Wt 57.3 kg (126 lb 5.2 oz)   SpO2 98%   BMI 23.87 kg/m²     "

## 2024-03-11 NOTE — ED NOTES
Pt ambulatory to room 59. UA sent. Pt reports she was tested for gonorrhea/chlamydia at  when dysuria started, results negative. Chart up for ERP.

## 2024-03-12 NOTE — ED NOTES
Patient discharged home per ERP.  Discharge teaching and education discussed with patient. POC discussed.   Patient verbalized understanding of discharge teaching and education. No other questions at this time.     RX for keflex sent to pt's pharmacy.   PIV removed.     VSS. Patient alert and oriented. Patient arranged ride for self. Able to ambulate off unit safely with steady gait.

## 2024-05-04 RX ORDER — FLUTICASONE PROPIONATE 50 MCG
1 SPRAY, SUSPENSION (ML) NASAL DAILY
Qty: 16 G | Refills: 0 | OUTPATIENT
Start: 2024-05-04

## 2024-05-17 ENCOUNTER — APPOINTMENT (OUTPATIENT)
Dept: RADIOLOGY | Facility: IMAGING CENTER | Age: 24
End: 2024-05-17
Attending: FAMILY MEDICINE
Payer: COMMERCIAL

## 2024-05-17 ENCOUNTER — OFFICE VISIT (OUTPATIENT)
Dept: URGENT CARE | Facility: CLINIC | Age: 24
End: 2024-05-17
Payer: COMMERCIAL

## 2024-05-17 VITALS
OXYGEN SATURATION: 99 % | TEMPERATURE: 97.6 F | DIASTOLIC BLOOD PRESSURE: 64 MMHG | HEIGHT: 61 IN | WEIGHT: 120 LBS | SYSTOLIC BLOOD PRESSURE: 102 MMHG | HEART RATE: 98 BPM | BODY MASS INDEX: 22.66 KG/M2 | RESPIRATION RATE: 16 BRPM

## 2024-05-17 DIAGNOSIS — S99.912A INJURY OF LEFT ANKLE, INITIAL ENCOUNTER: ICD-10-CM

## 2024-05-17 DIAGNOSIS — S93.412A SPRAIN OF CALCANEOFIBULAR LIGAMENT OF LEFT ANKLE, INITIAL ENCOUNTER: ICD-10-CM

## 2024-05-17 PROCEDURE — 73610 X-RAY EXAM OF ANKLE: CPT | Mod: TC,LT | Performed by: RADIOLOGY

## 2024-05-17 PROCEDURE — 3078F DIAST BP <80 MM HG: CPT | Performed by: FAMILY MEDICINE

## 2024-05-17 PROCEDURE — 3074F SYST BP LT 130 MM HG: CPT | Performed by: FAMILY MEDICINE

## 2024-05-17 PROCEDURE — 99214 OFFICE O/P EST MOD 30 MIN: CPT | Performed by: FAMILY MEDICINE

## 2024-05-17 ASSESSMENT — ENCOUNTER SYMPTOMS
CARDIOVASCULAR NEGATIVE: 1
RESPIRATORY NEGATIVE: 1
CONSTITUTIONAL NEGATIVE: 1
GASTROINTESTINAL NEGATIVE: 1

## 2024-05-17 ASSESSMENT — FIBROSIS 4 INDEX: FIB4 SCORE: 0.49

## 2024-05-17 NOTE — LETTER
[facility]  [address] [facility] [id number]     May 17, 2024    Patient: Dian Taylor   YOB: 2000   Date of Visit: 5/17/2024       To Whom It May Concern:    Dian Taylor was seen and treated in our department on 5/17/2024. Please excuse the next week of work for injury.    Sincerely,     Mathieu Cunningham M.D.

## 2024-05-17 NOTE — PROGRESS NOTES
"Subjective:   Dian Taylor is a 24 y.o. female who presents for Ankle Injury (L ankle injury)      Turned left ankle on a curb, swelling and pain.    Ankle Injury         Review of Systems   Constitutional: Negative.    Respiratory: Negative.     Cardiovascular: Negative.    Gastrointestinal: Negative.    Genitourinary: Negative.    Musculoskeletal:         Left ankle injury       Medications, Allergies, and current problem list reviewed today in Epic.     Objective:     /64   Pulse 98   Temp 36.4 °C (97.6 °F) (Temporal)   Resp 16   Ht 1.549 m (5' 1\")   Wt 54.4 kg (120 lb)   SpO2 99%     Physical Exam  Vitals and nursing note reviewed.   Cardiovascular:      Rate and Rhythm: Normal rate and regular rhythm.      Pulses: Normal pulses.      Heart sounds: Normal heart sounds.   Pulmonary:      Effort: Pulmonary effort is normal.      Breath sounds: Normal breath sounds.   Musculoskeletal:      Comments: Left ankle pain, swelling, bruising, pain with wt bearing or movement         Assessment/Plan:     Diagnosis and associated orders:     1. Injury of left ankle, initial encounter  DX-ANKLE 3+ VIEWS LEFT      2. Sprain of calcaneofibular ligament of left ankle, initial encounter  CRUTCHES         Comments/MDM:     No fx seen  [employer]  Note for work         Differential diagnosis, natural history, supportive care, and indications for immediate follow-up discussed.    Advised the patient to follow-up with the primary care physician for recheck, reevaluation, and consideration of further management.    Please note that this dictation was created using voice recognition software. I have made a reasonable attempt to correct obvious errors, but I expect that there are errors of grammar and possibly content that I did not discover before finalizing the note.    This note was electronically signed by Mathieu Cunningham M.D.  "

## 2024-08-27 ENCOUNTER — NON-PROVIDER VISIT (OUTPATIENT)
Dept: OCCUPATIONAL MEDICINE | Facility: CLINIC | Age: 24
End: 2024-08-27

## 2024-08-27 DIAGNOSIS — Z02.1 PRE-EMPLOYMENT DRUG SCREENING: ICD-10-CM

## 2024-08-27 LAB
AMP AMPHETAMINE: NORMAL
COC COCAINE: NORMAL
INT CON NEG: NORMAL
INT CON POS: NORMAL
MET METHAMPHETAMINES: NORMAL
OPI OPIATES: NORMAL
PCP PHENCYCLIDINE: NORMAL
POC DRUG COMMENT 753798-OCCUPATIONAL HEALTH: NEGATIVE
THC: NORMAL

## 2024-08-27 PROCEDURE — 80305 DRUG TEST PRSMV DIR OPT OBS: CPT | Performed by: PREVENTIVE MEDICINE

## 2025-04-15 ENCOUNTER — OFFICE VISIT (OUTPATIENT)
Dept: URGENT CARE | Facility: CLINIC | Age: 25
End: 2025-04-15
Payer: COMMERCIAL

## 2025-04-15 ENCOUNTER — HOSPITAL ENCOUNTER (OUTPATIENT)
Facility: MEDICAL CENTER | Age: 25
End: 2025-04-15
Attending: NURSE PRACTITIONER
Payer: COMMERCIAL

## 2025-04-15 VITALS
DIASTOLIC BLOOD PRESSURE: 80 MMHG | OXYGEN SATURATION: 99 % | HEART RATE: 82 BPM | HEIGHT: 61 IN | BODY MASS INDEX: 21.79 KG/M2 | TEMPERATURE: 98.6 F | WEIGHT: 115.4 LBS | SYSTOLIC BLOOD PRESSURE: 104 MMHG | RESPIRATION RATE: 16 BRPM

## 2025-04-15 DIAGNOSIS — R30.0 DYSURIA: ICD-10-CM

## 2025-04-15 DIAGNOSIS — Z11.3 SCREENING EXAMINATION FOR STD (SEXUALLY TRANSMITTED DISEASE): Primary | ICD-10-CM

## 2025-04-15 LAB
APPEARANCE UR: CLEAR
BILIRUB UR STRIP-MCNC: NORMAL MG/DL
COLOR UR AUTO: YELLOW
GLUCOSE UR STRIP.AUTO-MCNC: NORMAL MG/DL
KETONES UR STRIP.AUTO-MCNC: NORMAL MG/DL
LEUKOCYTE ESTERASE UR QL STRIP.AUTO: NORMAL
NITRITE UR QL STRIP.AUTO: NORMAL
PH UR STRIP.AUTO: 7 [PH] (ref 5–8)
POCT INT CON NEG: NEGATIVE
POCT INT CON POS: POSITIVE
POCT URINE PREGNANCY TEST: NEGATIVE
PROT UR QL STRIP: NORMAL MG/DL
RBC UR QL AUTO: NORMAL
SP GR UR STRIP.AUTO: 1.01
UROBILINOGEN UR STRIP-MCNC: 0.2 MG/DL

## 2025-04-15 PROCEDURE — 81025 URINE PREGNANCY TEST: CPT | Performed by: NURSE PRACTITIONER

## 2025-04-15 PROCEDURE — 87491 CHLMYD TRACH DNA AMP PROBE: CPT

## 2025-04-15 PROCEDURE — 87591 N.GONORRHOEAE DNA AMP PROB: CPT

## 2025-04-15 PROCEDURE — 81002 URINALYSIS NONAUTO W/O SCOPE: CPT | Performed by: NURSE PRACTITIONER

## 2025-04-15 PROCEDURE — 99213 OFFICE O/P EST LOW 20 MIN: CPT | Performed by: NURSE PRACTITIONER

## 2025-04-15 RX ORDER — TRAZODONE HYDROCHLORIDE 50 MG/1
TABLET ORAL
COMMUNITY
Start: 2025-03-21

## 2025-04-15 ASSESSMENT — ENCOUNTER SYMPTOMS
FEVER: 0
CHILLS: 0
FATIGUE: 0
FLANK PAIN: 0

## 2025-04-15 ASSESSMENT — FIBROSIS 4 INDEX: FIB4 SCORE: 0.51

## 2025-04-16 LAB
C TRACH DNA GENITAL QL NAA+PROBE: NEGATIVE
N GONORRHOEA DNA GENITAL QL NAA+PROBE: NEGATIVE
SPECIMEN SOURCE: NORMAL

## 2025-04-16 NOTE — PROGRESS NOTES
"Subjective:   Dian Taylor is a 25 y.o. female who presents for Other (Partner currently has bladder problems. Pt would like to get tested for STI.)      Sexually Transmitted Diseases  This is a new problem. Episode onset: no sx. The problem occurs constantly. The problem has been unchanged. Pertinent negatives include no chills, fatigue, fever or urinary symptoms. She has tried nothing for the symptoms.       Review of Systems   Constitutional:  Negative for chills, fatigue and fever.   Genitourinary:  Negative for dysuria, flank pain, frequency, hematuria and urgency.       Medications:    escitalopram Tabs  fluticasone  traZODone Tabs    Allergies: Patient has no known allergies.    Problem List: Dian Taylor does not have a problem list on file.    Surgical History:  No past surgical history on file.    Past Social Hx: Dian Taylor  reports that she has never smoked. She has never used smokeless tobacco. She reports current alcohol use. She reports current drug use. Drugs: Inhaled and Marijuana.     Past Family Hx:  Dian Taylor family history is not on file.     Problem list, medications, and allergies reviewed by myself today in Epic.     Objective:     /80 (BP Location: Right arm, Patient Position: Sitting, BP Cuff Size: Adult)   Pulse 82   Temp 37 °C (98.6 °F) (Temporal)   Resp 16   Ht 1.549 m (5' 1\")   Wt 52.3 kg (115 lb 6.4 oz)   SpO2 99%   BMI 21.80 kg/m²     Physical Exam  Constitutional:       Appearance: Normal appearance. She is not ill-appearing or toxic-appearing.   HENT:      Head: Normocephalic.      Right Ear: External ear normal.      Left Ear: External ear normal.      Nose: Nose normal.      Mouth/Throat:      Lips: Pink.   Eyes:      General: Lids are normal.   Pulmonary:      Effort: Pulmonary effort is normal. No accessory muscle usage.   Musculoskeletal:      Cervical back: Full passive range of motion without " pain.   Neurological:      Mental Status: She is alert and oriented to person, place, and time.   Psychiatric:         Mood and Affect: Mood normal.         Thought Content: Thought content normal.         Assessment/Plan:     Diagnosis and associated orders:   1. Screening examination for STD (sexually transmitted disease)  POCT Urinalysis    POCT Pregnancy    Chlamydia/GC, PCR (Genital/Anal swab)           Comments/MDM:   Results for orders placed or performed in visit on 04/15/25   POCT Urinalysis    Collection Time: 04/15/25  5:43 PM   Result Value Ref Range    POC Color Yellow Negative    POC Appearance Clear Negative    POC Glucose Neg Negative mg/dL    POC Bilirubin Neg Negative mg/dL    POC Ketones Neg Negative mg/dL    POC Specific Gravity 1.010 <1.005 - >1.030    POC Blood Moderate Negative    POC Urine PH 7.0 5.0 - 8.0    POC Protein Neg Negative mg/dL    POC Urobiligen 0.2 Negative (0.2) mg/dL    POC Nitrites Neg Negative    POC Leukocyte Esterase Neg Negative   POCT Pregnancy    Collection Time: 04/15/25  5:44 PM   Result Value Ref Range    POC Urine Pregnancy Test Negative     Internal Control Positive Positive     Internal Control Negative Negative        I personally reviewed prior external notes and prior test results pertinent to today's visit.   Discussed management options, risks and benefits, and alternatives to treatment plan agreed upon.   Red flags discussed and indications to immediately call 911 or present to the Emergency Department.   Supportive care, differential diagnoses, and indications for immediate follow-up discussed with patient.    Patient expresses understanding and agrees to plan. Patient denies any other questions or concerns.            Please note that this dictation was created using voice recognition software. I have made a reasonable attempt to correct obvious errors, but I expect that there are errors of grammar and possibly content that I did not discover before  finalizing the note.    This note was electronically signed by Lior OTERO.